# Patient Record
Sex: MALE | Race: WHITE
[De-identification: names, ages, dates, MRNs, and addresses within clinical notes are randomized per-mention and may not be internally consistent; named-entity substitution may affect disease eponyms.]

---

## 2020-02-11 ENCOUNTER — HOSPITAL ENCOUNTER (OUTPATIENT)
Dept: HOSPITAL 94 - PAS | Age: 70
Discharge: HOME | End: 2020-02-11
Attending: SURGERY
Payer: COMMERCIAL

## 2020-02-11 VITALS — SYSTOLIC BLOOD PRESSURE: 113 MMHG | DIASTOLIC BLOOD PRESSURE: 68 MMHG

## 2020-02-11 VITALS — SYSTOLIC BLOOD PRESSURE: 94 MMHG | DIASTOLIC BLOOD PRESSURE: 66 MMHG

## 2020-02-11 VITALS — SYSTOLIC BLOOD PRESSURE: 111 MMHG | DIASTOLIC BLOOD PRESSURE: 67 MMHG

## 2020-02-11 VITALS — SYSTOLIC BLOOD PRESSURE: 102 MMHG | DIASTOLIC BLOOD PRESSURE: 80 MMHG

## 2020-02-11 VITALS — SYSTOLIC BLOOD PRESSURE: 101 MMHG | DIASTOLIC BLOOD PRESSURE: 74 MMHG

## 2020-02-11 VITALS — BODY MASS INDEX: 35.42 KG/M2 | HEIGHT: 68 IN | WEIGHT: 233.69 LBS

## 2020-02-11 VITALS — DIASTOLIC BLOOD PRESSURE: 77 MMHG | SYSTOLIC BLOOD PRESSURE: 134 MMHG

## 2020-02-11 VITALS — DIASTOLIC BLOOD PRESSURE: 72 MMHG | SYSTOLIC BLOOD PRESSURE: 99 MMHG

## 2020-02-11 VITALS — DIASTOLIC BLOOD PRESSURE: 72 MMHG | SYSTOLIC BLOOD PRESSURE: 106 MMHG

## 2020-02-11 DIAGNOSIS — Z98.890: ICD-10-CM

## 2020-02-11 DIAGNOSIS — Z79.82: ICD-10-CM

## 2020-02-11 DIAGNOSIS — M19.90: ICD-10-CM

## 2020-02-11 DIAGNOSIS — I50.9: ICD-10-CM

## 2020-02-11 DIAGNOSIS — I11.0: ICD-10-CM

## 2020-02-11 DIAGNOSIS — E78.5: ICD-10-CM

## 2020-02-11 DIAGNOSIS — I25.10: ICD-10-CM

## 2020-02-11 DIAGNOSIS — E03.9: ICD-10-CM

## 2020-02-11 DIAGNOSIS — Z95.1: ICD-10-CM

## 2020-02-11 DIAGNOSIS — K21.9: ICD-10-CM

## 2020-02-11 DIAGNOSIS — E11.9: ICD-10-CM

## 2020-02-11 DIAGNOSIS — Z94.1: ICD-10-CM

## 2020-02-11 DIAGNOSIS — I48.91: ICD-10-CM

## 2020-02-11 DIAGNOSIS — N40.0: ICD-10-CM

## 2020-02-11 DIAGNOSIS — Z45.02: Primary | ICD-10-CM

## 2020-02-11 DIAGNOSIS — Z87.891: ICD-10-CM

## 2020-02-11 DIAGNOSIS — Z79.899: ICD-10-CM

## 2020-02-11 DIAGNOSIS — M10.9: ICD-10-CM

## 2020-02-11 LAB
ALBUMIN SERPL BCP-MCNC: 3.8 G/DL (ref 3.4–5)
ALBUMIN/GLOB SERPL: 1.1 {RATIO} (ref 1.1–1.5)
ALP SERPL-CCNC: 51 IU/L (ref 46–116)
ALT SERPL W P-5'-P-CCNC: 24 U/L (ref 30–65)
ANION GAP SERPL CALCULATED.3IONS-SCNC: 7 MMOL/L (ref 8–16)
APTT PPP: 27 SECONDS (ref 22–32)
AST SERPL W P-5'-P-CCNC: 19 U/L (ref 10–37)
BASOPHILS # BLD AUTO: 0.1 X10'3 (ref 0–0.2)
BASOPHILS NFR BLD AUTO: 0.7 % (ref 0–1)
BILIRUB SERPL-MCNC: 0.6 MG/DL (ref 0–1)
BUN SERPL-MCNC: 24 MG/DL (ref 7–18)
BUN/CREAT SERPL: 17.6 (ref 5.4–32)
CALCIUM SERPL-MCNC: 9.9 MG/DL (ref 8.5–10.1)
CHLORIDE SERPL-SCNC: 99 MMOL/L (ref 99–107)
CO2 SERPL-SCNC: 32.2 MMOL/L (ref 24–32)
CREAT SERPL-MCNC: 1.36 MG/DL (ref 0.6–1.1)
EOSINOPHIL # BLD AUTO: 0.1 X10'3 (ref 0–0.9)
EOSINOPHIL NFR BLD AUTO: 1 % (ref 0–6)
ERYTHROCYTE [DISTWIDTH] IN BLOOD BY AUTOMATED COUNT: 13.3 % (ref 11.5–14.5)
GFR SERPL CREATININE-BSD FRML MDRD: 52 ML/MIN
GLUCOSE SERPL-MCNC: 210 MG/DL (ref 70–104)
HCT VFR BLD AUTO: 44.2 % (ref 42–52)
HGB BLD-MCNC: 15.1 G/DL (ref 14–17.9)
LYMPHOCYTES # BLD AUTO: 1.5 X10'3 (ref 1.1–4.8)
LYMPHOCYTES NFR BLD AUTO: 15.7 % (ref 21–51)
MCH RBC QN AUTO: 29.7 PG (ref 27–31)
MCHC RBC AUTO-ENTMCNC: 34.2 G/DL (ref 33–36.5)
MCV RBC AUTO: 87 FL (ref 78–98)
MONOCYTES # BLD AUTO: 1 X10'3 (ref 0–0.9)
MONOCYTES NFR BLD AUTO: 9.9 % (ref 2–12)
NEUTROPHILS # BLD AUTO: 7.2 X10'3 (ref 1.8–7.7)
NEUTROPHILS NFR BLD AUTO: 72.7 % (ref 42–75)
PLATELET # BLD AUTO: 217 X10'3 (ref 140–440)
PMV BLD AUTO: 8.9 FL (ref 7.4–10.4)
POTASSIUM SERPL-SCNC: 3.5 MMOL/L (ref 3.4–5.1)
PROT SERPL-MCNC: 7.4 G/DL (ref 6.4–8.2)
RBC # BLD AUTO: 5.08 X10'6 (ref 4.7–6.1)
SODIUM SERPL-SCNC: 138 MMOL/L (ref 135–145)

## 2020-02-11 PROCEDURE — 33264 RMVL & RPLCMT DFB GEN MLT LD: CPT

## 2020-02-11 PROCEDURE — 80053 COMPREHEN METABOLIC PANEL: CPT

## 2020-02-11 PROCEDURE — 85730 THROMBOPLASTIN TIME PARTIAL: CPT

## 2020-02-11 PROCEDURE — 85025 COMPLETE CBC W/AUTO DIFF WBC: CPT

## 2020-02-11 PROCEDURE — 36415 COLL VENOUS BLD VENIPUNCTURE: CPT

## 2020-02-11 PROCEDURE — 71045 X-RAY EXAM CHEST 1 VIEW: CPT

## 2020-02-11 PROCEDURE — 93005 ELECTROCARDIOGRAM TRACING: CPT

## 2020-02-11 PROCEDURE — 85610 PROTHROMBIN TIME: CPT

## 2020-02-11 NOTE — NUR
Report called to receiving nurse. [facility] 

Special Issues communicated to receiving nurse. AWAKE AND ORIENTED. VITALS STABLE. DRESSING 
DI. KARYNA PAIN. TO PAS RM [id number] AT THIS TIME.

## 2020-02-11 NOTE — NUR
Received from OR via , accompanied by Anesthesiologist DR BUENO and report given by 
Anesthesiolgist. AWAKE AND KARYNA PAIN. VITALS STABLE. DRESSING DI. SIMPLE SLING TO LUE.

## 2020-02-11 NOTE — NUR
D/C INSTRUCTIONS GIVEN AND GONE OVER W/PT, PT D/CD TO HOME VIA W/C TO PRIVATE VEHICLE W/O 
INCIDENT.